# Patient Record
Sex: MALE | Race: WHITE | NOT HISPANIC OR LATINO | RURAL
[De-identification: names, ages, dates, MRNs, and addresses within clinical notes are randomized per-mention and may not be internally consistent; named-entity substitution may affect disease eponyms.]

---

## 2021-06-25 ENCOUNTER — OFFICE VISIT (OUTPATIENT)
Dept: RURAL CLINIC 8 | Facility: CLINIC | Age: 41
End: 2021-06-25
Payer: MEDICARE

## 2021-06-25 ENCOUNTER — DASHBOARD ENCOUNTERS (OUTPATIENT)
Age: 41
End: 2021-06-25

## 2021-06-25 DIAGNOSIS — M45.9 ANKYLOSING SPONDYLITIS, UNSPECIFIED SITE OF SPINE: ICD-10-CM

## 2021-06-25 DIAGNOSIS — K83.8 DILATED BILE DUCT: ICD-10-CM

## 2021-06-25 DIAGNOSIS — R79.89 ELEVATED FERRITIN: ICD-10-CM

## 2021-06-25 DIAGNOSIS — R94.5 ABNORMAL LFTS: ICD-10-CM

## 2021-06-25 PROBLEM — 9631008: Status: ACTIVE | Noted: 2021-06-25

## 2021-06-25 PROBLEM — 123608004: Status: ACTIVE | Noted: 2021-06-25

## 2021-06-25 PROCEDURE — 99204 OFFICE O/P NEW MOD 45 MIN: CPT | Performed by: INTERNAL MEDICINE

## 2021-06-25 RX ORDER — HYDROCHLOROTHIAZIDE 12.5 MG/1
1 CAPSULE IN THE MORNING CAPSULE ORAL ONCE A DAY
Status: ACTIVE | COMMUNITY

## 2021-06-25 RX ORDER — TIZANIDINE 4 MG/1
1 TABLET AS NEEDED TABLET ORAL THREE TIMES A DAY
Status: ACTIVE | COMMUNITY

## 2021-06-25 RX ORDER — ETANERCEPT 50 MG/ML
AS DIRECTED SOLUTION SUBCUTANEOUS
Status: ACTIVE | COMMUNITY

## 2021-06-25 RX ORDER — LISINOPRIL 10 MG/1
1 TABLET TABLET ORAL ONCE A DAY
Status: ACTIVE | COMMUNITY

## 2021-06-25 RX ORDER — HYDROCODONE BITARTRATE AND ACETAMINOPHEN 10; 325 MG/1; MG/1
1 TABLET AS NEEDED TABLET ORAL
Status: ACTIVE | COMMUNITY

## 2021-06-25 NOTE — HPI-TODAY'S VISIT:
the patient comes today for evaluation of abnormal liver enzymes.  Labs I see from April 2021 show AST and ALT of 49 and 105 with a bilirubin of 1.1.  Alkaline phosphatase was 155.  He did undergo a comprehensive serological evaluation for liver disease.  Alpha 1 antitrypsin level was normal.  Smooth muscle antibody, AMA, CELINA were also negative.  He did have an elevated ferritin however at around 400-500 range.  Ceruloplasmin levels were normal. -   He has history of cholecystectomy.  He underwent an MRI / MRCP.  This showed hepatomegaly with hepatic steatosis.  There was also intra and extrahepatic biliary dilation.  There were some blunted appearance of the common bile duct at the level of the ampulla.  No choledocholithiasis or mass was definitively appreciated. -   The patient says that he does not consume alcohol or herbal products.  He also does not have any known family history of liver disease. -   He had 1 episode of hematochezia several months ago but this was attributed to a hemorrhoid.  It has not happened since then.  He does follow with a rheumatologist for history of ankylosing spondylitis

## 2021-07-30 ENCOUNTER — OFFICE VISIT (OUTPATIENT)
Dept: URBAN - METROPOLITAN AREA MEDICAL CENTER 23 | Facility: MEDICAL CENTER | Age: 41
End: 2021-07-30
Payer: MEDICARE

## 2021-07-30 DIAGNOSIS — K83.8 ACQUIRED DILATION OF BILE DUCT: ICD-10-CM

## 2021-07-30 DIAGNOSIS — K80.50 BILE DUCT CALCULUS: ICD-10-CM

## 2021-07-30 PROCEDURE — 43264 ERCP REMOVE DUCT CALCULI: CPT | Performed by: INTERNAL MEDICINE

## 2021-07-30 PROCEDURE — 74328 X-RAY BILE DUCT ENDOSCOPY: CPT | Performed by: INTERNAL MEDICINE

## 2021-07-30 PROCEDURE — 43259 EGD US EXAM DUODENUM/JEJUNUM: CPT | Performed by: INTERNAL MEDICINE

## 2021-07-30 PROCEDURE — 43262 ENDO CHOLANGIOPANCREATOGRAPH: CPT | Performed by: INTERNAL MEDICINE

## 2021-07-30 RX ORDER — TIZANIDINE 4 MG/1
1 TABLET AS NEEDED TABLET ORAL THREE TIMES A DAY
Status: ACTIVE | COMMUNITY

## 2021-07-30 RX ORDER — HYDROCODONE BITARTRATE AND ACETAMINOPHEN 10; 325 MG/1; MG/1
1 TABLET AS NEEDED TABLET ORAL
Status: ACTIVE | COMMUNITY

## 2021-07-30 RX ORDER — ETANERCEPT 50 MG/ML
AS DIRECTED SOLUTION SUBCUTANEOUS
Status: ACTIVE | COMMUNITY

## 2021-07-30 RX ORDER — HYDROCHLOROTHIAZIDE 12.5 MG/1
1 CAPSULE IN THE MORNING CAPSULE ORAL ONCE A DAY
Status: ACTIVE | COMMUNITY

## 2021-07-30 RX ORDER — LISINOPRIL 10 MG/1
1 TABLET TABLET ORAL ONCE A DAY
Status: ACTIVE | COMMUNITY